# Patient Record
Sex: FEMALE | Race: WHITE | NOT HISPANIC OR LATINO | Employment: PART TIME | URBAN - METROPOLITAN AREA
[De-identification: names, ages, dates, MRNs, and addresses within clinical notes are randomized per-mention and may not be internally consistent; named-entity substitution may affect disease eponyms.]

---

## 2017-04-26 ENCOUNTER — OFFICE VISIT (OUTPATIENT)
Dept: URGENT CARE | Facility: PHYSICIAN GROUP | Age: 37
End: 2017-04-26

## 2017-04-26 VITALS
SYSTOLIC BLOOD PRESSURE: 124 MMHG | HEART RATE: 105 BPM | RESPIRATION RATE: 20 BRPM | WEIGHT: 228 LBS | DIASTOLIC BLOOD PRESSURE: 72 MMHG | OXYGEN SATURATION: 98 % | TEMPERATURE: 99 F

## 2017-04-26 DIAGNOSIS — H00.014 HORDEOLUM EXTERNUM LEFT UPPER EYELID: ICD-10-CM

## 2017-04-26 PROCEDURE — 99203 OFFICE O/P NEW LOW 30 MIN: CPT | Performed by: PHYSICIAN ASSISTANT

## 2017-04-26 RX ORDER — SULFAMETHOXAZOLE AND TRIMETHOPRIM 800; 160 MG/1; MG/1
1 TABLET ORAL 2 TIMES DAILY
COMMUNITY

## 2017-04-26 RX ORDER — CEFTRIAXONE 500 MG/1
500 INJECTION, POWDER, FOR SOLUTION INTRAMUSCULAR; INTRAVENOUS ONCE
Status: COMPLETED | OUTPATIENT
Start: 2017-04-26 | End: 2017-04-26

## 2017-04-26 RX ORDER — GENTAMICIN SULFATE 3 MG/ML
1 SOLUTION/ DROPS OPHTHALMIC EVERY 4 HOURS
COMMUNITY

## 2017-04-26 RX ADMIN — CEFTRIAXONE 500 MG: 500 INJECTION, POWDER, FOR SOLUTION INTRAMUSCULAR; INTRAVENOUS at 13:52

## 2017-04-26 ASSESSMENT — ENCOUNTER SYMPTOMS
ABDOMINAL PAIN: 0
DIARRHEA: 0
DOUBLE VISION: 0
CHILLS: 0
VOMITING: 0
HEADACHES: 0
PHOTOPHOBIA: 0
EYE REDNESS: 1
BLURRED VISION: 0
EYE PAIN: 1
NAUSEA: 0
SHORTNESS OF BREATH: 0
DIZZINESS: 0
EYE DISCHARGE: 0
MUSCULOSKELETAL NEGATIVE: 1
FEVER: 0

## 2017-04-26 ASSESSMENT — PAIN SCALES - GENERAL: PAINLEVEL: 8=MODERATE-SEVERE PAIN

## 2017-04-26 NOTE — MR AVS SNAPSHOT
Daniela Stevenson   2017 12:40 PM   Office Visit   MRN: [id number]    Department:  [facility]   Dept Phone:  [phone]    Description:  Female : 1980   Provider:  Mary Ellen Mina PA-C           Reason for Visit     Eye Problem x6 days. Lt eye swelling, redness, swelling of cheek, pain.      Allergies as of 2017     Allergen Noted Reactions    Erythromycin 2017   Diarrhea, Vomiting           You were diagnosed with     Hordeolum externum left upper eyelid   [[id number]]         Vital Signs     Blood Pressure Pulse Temperature Respirations Weight Oxygen Saturation    124/72 mmHg 105 37.2 °C (99 °F) 20 103.42 kg (228 lb) 98%      Basic Information     Date Of Birth Sex Race Ethnicity Preferred Language    1980 Female White Non- English      Health Maintenance     Patient has no pending health maintenance at this time      Current Immunizations     No immunizations on file.      Below and/or attached are the medications your provider expects you to take. Review all of your home medications and newly ordered medications with your provider and/or pharmacist. Follow medication instructions as directed by your provider and/or pharmacist. Please keep your medication list with you and share with your provider. Update the information when medications are discontinued, doses are changed, or new medications (including over-the-counter products) are added; and carry medication information at all times in the event of emergency situations     Allergies:  ERYTHROMYCIN - Diarrhea,Vomiting               Medications  Valid as of: 2017 -  1:42 PM    Generic Name Brand Name Tablet Size Instructions for use    Gentamicin Sulfate (Solution) GARAMYCIN 0.3 % Place 1 Drop in both eyes every 4 hours.        Sulfamethoxazole-Trimethoprim (Tab) BACTRIM -160 MG Take 1 Tab by mouth 2 times a day.        .                 Medicines prescribed today were sent to:     [facility]  [address] - [place] [id number] [facility]    [address] [facility] [address]    Phone: [phone] Fax: [phone]    Open 24 Hours?: No      Medication refill instructions:       If your prescription bottle indicates you have medication refills left, it is not necessary to call your provider’s office. Please contact your pharmacy and they will refill your medication.    If your prescription bottle indicates you do not have any refills left, you may request refills at any time through one of the following ways: The online [employer] system (except Urgent Care), by calling your provider’s office, or by asking your pharmacy to contact your provider’s office with a refill request. Medication refills are processed only during regular business hours and may not be available until the next business day. Your provider may request additional information or to have a follow-up visit with you prior to refilling your medication.   *Please Note: Medication refills are assigned a new Rx number when refilled electronically. Your pharmacy may indicate that no refills were authorized even though a new prescription for the same medication is available at the pharmacy. Please request the medicine by name with the pharmacy before contacting your provider for a refill.        Referral     A referral request has been sent to our patient care coordination department. Please allow 3-5 business days for us to process this request and contact you either by phone or mail. If you do not hear from us by the 5th business day, please call us at [phone].           FlyBridGe Access Code: [email or web address]  Expires: 5/26/2017  1:42 PM    [employer]ridGe  A secure, online tool to manage your health information     [employer]’s FlyBridGe® is a secure, online tool that connects you to your personalized health information from the privacy of your home -- day or night - making it very easy for you to manage your healthcare. Once the activation  process is completed, you can even access your medical information using the Cura TV shira, which is available for free in the Apple Shira store or Google Play store.     Cura TV provides the following levels of access (as shown below):   My Chart Features   [facility] Doctor [facility]  Specialists [facility]  Urgent  Care Non-[facility]  Primary Care  Doctor   Email your healthcare team securely and privately 24/7 X X X    Manage appointments: schedule your next appointment; view details of past/upcoming appointments X      Request prescription refills. X      View recent personal medical records, including lab and immunizations X X X X   View health record, including health history, allergies, medications X X X X   Read reports about your outpatient visits, procedures, consult and ER notes X X X X   See your discharge summary, which is a recap of your hospital and/or ER visit that includes your diagnosis, lab results, and care plan. X X       How to register for Cura TV:  1. Go to  [email or web address].  2. Click on the Sign Up Now box, which takes you to the New Member Sign Up page. You will need to provide the following information:  a. Enter your Cura TV Access Code exactly as it appears at the top of this page. (You will not need to use this code after you’ve completed the sign-up process. If you do not sign up before the expiration date, you must request a new code.)   b. Enter your date of birth.   c. Enter your home email address.   d. Click Submit, and follow the next screen’s instructions.  3. Create a Cura TV ID. This will be your Cura TV login ID and cannot be changed, so think of one that is secure and easy to remember.  4. Create a Cura TV password. You can change your password at any time.  5. Enter your Password Reset Question and Answer. This can be used at a later time if you forget your password.   6. Enter your e-mail address. This allows you to receive e-mail notifications when new information  is available in [employer].  7. Click Sign Up. You can now view your health information.    For assistance activating your [employer] account, call [phone]

## 2017-04-26 NOTE — PROGRESS NOTES
Subjective:      Daniela Stevenson is a 36 y.o. female who presents with Eye Problem            Eye Problem   The left eye is affected. This is a new problem. The current episode started in the past 7 days (6 days). The problem occurs constantly. The problem has been gradually worsening. The pain is at a severity of 3/10. The pain is mild. There is no known exposure to pink eye. She does not wear contacts. Associated symptoms include eye redness. Pertinent negatives include no blurred vision, eye discharge, double vision, fever, nausea, photophobia or vomiting. She has tried eye drops (oral antibiotics) for the symptoms.     Patient reports to urgent care reporting worsening eye swelling x 6 days. She was seen in [place] urgent care 3 days ago and started on oral Bactrim BID and given Gentamicin eye drops, which she has been using as prescribed. She has also been putting warm compresses on the eye multiple times daily.     Review of Systems   Constitutional: Negative for fever and chills.   Eyes: Positive for pain and redness. Negative for blurred vision, double vision, photophobia and discharge.   Respiratory: Negative for shortness of breath.    Cardiovascular: Negative for chest pain.   Gastrointestinal: Negative for nausea, vomiting, abdominal pain and diarrhea.   Genitourinary: Negative.    Musculoskeletal: Negative.    Neurological: Negative for dizziness and headaches.          Objective:     /72 mmHg  Pulse 105  Temp(Src) 37.2 °C (99 °F)  Resp 20  Wt 103.42 kg (228 lb)  SpO2 98%     Physical Exam   Constitutional: She is oriented to person, place, and time. She appears well-developed and well-nourished. No distress.   HENT:   Head: Normocephalic and atraumatic.   Eyes: EOM are normal. Pupils are equal, round, and reactive to light. Left eye exhibits hordeolum. Left eye exhibits no chemosis and no discharge. No foreign body present in the left eye. Right conjunctiva is not injected. Right  conjunctiva has no hemorrhage. Left conjunctiva is not injected. Left conjunctiva has no hemorrhage.       Erythema, edema and TTP over left upper eyelid. Wood's lamp exam performed, negative for corneal abrasions.    Cardiovascular: Normal rate.    Pulmonary/Chest: Effort normal.   Musculoskeletal: Normal range of motion.   Neurological: She is alert and oriented to person, place, and time.   Skin: Skin is warm and dry. She is not diaphoretic.   Psychiatric: She has a normal mood and affect. Her behavior is normal.   Nursing note and vitals reviewed.         Medications, Allergies, and current problem list reviewed today in Epic       Assessment/Plan:     1. Hordeolum externum left upper eyelid  - cefTRIAXone (ROCEPHIN) injection 500 mg; 500 mg by Intramuscular route Once.   - given in clinic   - REFERRAL TO OPHTHALMOLOGY    Patient advised to continue Bactrim as prescribed along with antibiotic eye drops until she follow up with the ophthamologist. Referral placed at today's visit for possible I&D. Continue warm compresses with clean wash clothes 3-4 times daily. Keep eye clean, advised to avoid any eye make up use until symptoms have resolved. The patient demonstrated a good understanding and agreed with the treatment plan.